# Patient Record
Sex: FEMALE | Race: WHITE | Employment: UNEMPLOYED | URBAN - METROPOLITAN AREA
[De-identification: names, ages, dates, MRNs, and addresses within clinical notes are randomized per-mention and may not be internally consistent; named-entity substitution may affect disease eponyms.]

---

## 2018-12-27 PROBLEM — M20.5X1 HALLUX LIMITUS OF RIGHT FOOT: Status: ACTIVE | Noted: 2018-12-27

## 2019-03-07 PROBLEM — M10.071 ACUTE IDIOPATHIC GOUT OF RIGHT FOOT: Status: ACTIVE | Noted: 2019-03-07

## 2021-10-14 PROBLEM — E11.9 DIABETES MELLITUS (HCC): Status: ACTIVE | Noted: 2021-10-14

## 2021-10-14 PROBLEM — M25.551 PAIN OF RIGHT HIP JOINT: Status: ACTIVE | Noted: 2021-10-14

## 2021-10-14 PROBLEM — R00.2 PALPITATIONS: Status: ACTIVE | Noted: 2021-10-14

## 2021-12-17 PROBLEM — H25.13 AGE-RELATED NUCLEAR CATARACT OF BOTH EYES: Status: ACTIVE | Noted: 2021-10-11

## 2023-03-31 ENCOUNTER — HOSPITAL ENCOUNTER (EMERGENCY)
Age: 68
Discharge: HOME | End: 2023-03-31
Attending: EMERGENCY MEDICINE
Payer: COMMERCIAL

## 2023-03-31 ENCOUNTER — APPOINTMENT (OUTPATIENT)
Dept: CT IMAGING | Age: 68
End: 2023-03-31
Attending: EMERGENCY MEDICINE
Payer: COMMERCIAL

## 2023-03-31 VITALS
HEART RATE: 80 BPM | OXYGEN SATURATION: 97 % | RESPIRATION RATE: 18 BRPM | HEIGHT: 62 IN | DIASTOLIC BLOOD PRESSURE: 83 MMHG | TEMPERATURE: 97 F | SYSTOLIC BLOOD PRESSURE: 132 MMHG | WEIGHT: 150 LBS | BODY MASS INDEX: 27.6 KG/M2

## 2023-03-31 DIAGNOSIS — R10.9 RIGHT FLANK PAIN: Primary | ICD-10-CM

## 2023-03-31 LAB
ALBUMIN SERPL-MCNC: 4 G/DL (ref 3.4–5)
ALBUMIN/GLOB SERPL: 1.1 {RATIO} (ref 1–2)
ALP LIVER SERPL-CCNC: 59 U/L
ALT SERPL-CCNC: 34 U/L
ANION GAP SERPL CALC-SCNC: 8 MMOL/L (ref 0–18)
AST SERPL-CCNC: 19 U/L (ref 15–37)
BASOPHILS # BLD AUTO: 0.04 X10(3) UL (ref 0–0.2)
BASOPHILS NFR BLD AUTO: 0.4 %
BILIRUB SERPL-MCNC: 0.2 MG/DL (ref 0.1–2)
BILIRUB UR QL STRIP.AUTO: NEGATIVE
BUN BLD-MCNC: 14 MG/DL (ref 7–18)
CALCIUM BLD-MCNC: 9.6 MG/DL (ref 8.5–10.1)
CHLORIDE SERPL-SCNC: 102 MMOL/L (ref 98–112)
CLARITY UR REFRACT.AUTO: CLEAR
CO2 SERPL-SCNC: 28 MMOL/L (ref 21–32)
COLOR UR AUTO: YELLOW
CREAT BLD-MCNC: 0.89 MG/DL
EOSINOPHIL # BLD AUTO: 0.1 X10(3) UL (ref 0–0.7)
EOSINOPHIL NFR BLD AUTO: 0.9 %
ERYTHROCYTE [DISTWIDTH] IN BLOOD BY AUTOMATED COUNT: 13.8 %
GFR SERPLBLD BASED ON 1.73 SQ M-ARVRAT: 72 ML/MIN/1.73M2 (ref 60–?)
GLOBULIN PLAS-MCNC: 3.8 G/DL (ref 2.8–4.4)
GLUCOSE BLD-MCNC: 113 MG/DL (ref 70–99)
GLUCOSE UR STRIP.AUTO-MCNC: NEGATIVE MG/DL
HCT VFR BLD AUTO: 40 %
HGB BLD-MCNC: 13.2 G/DL
IMM GRANULOCYTES # BLD AUTO: 0.03 X10(3) UL (ref 0–1)
IMM GRANULOCYTES NFR BLD: 0.3 %
KETONES UR STRIP.AUTO-MCNC: NEGATIVE MG/DL
LEUKOCYTE ESTERASE UR QL STRIP.AUTO: NEGATIVE
LYMPHOCYTES # BLD AUTO: 2.74 X10(3) UL (ref 1–4)
LYMPHOCYTES NFR BLD AUTO: 25.6 %
MCH RBC QN AUTO: 29.2 PG (ref 26–34)
MCHC RBC AUTO-ENTMCNC: 33 G/DL (ref 31–37)
MCV RBC AUTO: 88.5 FL
MONOCYTES # BLD AUTO: 1.01 X10(3) UL (ref 0.1–1)
MONOCYTES NFR BLD AUTO: 9.4 %
NEUTROPHILS # BLD AUTO: 6.78 X10 (3) UL (ref 1.5–7.7)
NEUTROPHILS # BLD AUTO: 6.78 X10(3) UL (ref 1.5–7.7)
NEUTROPHILS NFR BLD AUTO: 63.4 %
NITRITE UR QL STRIP.AUTO: NEGATIVE
OSMOLALITY SERPL CALC.SUM OF ELEC: 287 MOSM/KG (ref 275–295)
PH UR STRIP.AUTO: 5.5 [PH] (ref 5–8)
PLATELET # BLD AUTO: 317 10(3)UL (ref 150–450)
POTASSIUM SERPL-SCNC: 3.9 MMOL/L (ref 3.5–5.1)
PROT SERPL-MCNC: 7.8 G/DL (ref 6.4–8.2)
PROT UR STRIP.AUTO-MCNC: NEGATIVE MG/DL
RBC # BLD AUTO: 4.52 X10(6)UL
SARS-COV-2 RNA RESP QL NAA+PROBE: NOT DETECTED
SODIUM SERPL-SCNC: 138 MMOL/L (ref 136–145)
SP GR UR STRIP.AUTO: 1.01 (ref 1–1.03)
UROBILINOGEN UR STRIP.AUTO-MCNC: 0.2 MG/DL
WBC # BLD AUTO: 10.7 X10(3) UL (ref 4–11)

## 2023-03-31 PROCEDURE — 99284 EMERGENCY DEPT VISIT MOD MDM: CPT

## 2023-03-31 PROCEDURE — 80053 COMPREHEN METABOLIC PANEL: CPT | Performed by: EMERGENCY MEDICINE

## 2023-03-31 PROCEDURE — 74176 CT ABD & PELVIS W/O CONTRAST: CPT | Performed by: EMERGENCY MEDICINE

## 2023-03-31 PROCEDURE — 81001 URINALYSIS AUTO W/SCOPE: CPT | Performed by: EMERGENCY MEDICINE

## 2023-03-31 PROCEDURE — 85025 COMPLETE CBC W/AUTO DIFF WBC: CPT | Performed by: EMERGENCY MEDICINE

## 2023-03-31 PROCEDURE — 81015 MICROSCOPIC EXAM OF URINE: CPT | Performed by: EMERGENCY MEDICINE

## 2023-03-31 PROCEDURE — 36415 COLL VENOUS BLD VENIPUNCTURE: CPT

## 2023-03-31 RX ORDER — KETOROLAC TROMETHAMINE 15 MG/ML
15 INJECTION, SOLUTION INTRAMUSCULAR; INTRAVENOUS ONCE
Status: DISCONTINUED | OUTPATIENT
Start: 2023-03-31 | End: 2023-03-31

## 2023-03-31 RX ORDER — SODIUM CHLORIDE 9 MG/ML
125 INJECTION, SOLUTION INTRAVENOUS CONTINUOUS
Status: DISCONTINUED | OUTPATIENT
Start: 2023-03-31 | End: 2023-03-31

## 2023-04-01 NOTE — DISCHARGE INSTRUCTIONS
Ibuprofen for pain 400 mg 3 times a day with food for 3 days  Warm compresses topically to abdominal wall, I also try lidocaine patch.   Return if worse  Follow with your primary care physician on Monday

## 2024-05-15 ENCOUNTER — OFFICE VISIT (OUTPATIENT)
Dept: FAMILY MEDICINE CLINIC | Facility: CLINIC | Age: 69
End: 2024-05-15

## 2024-05-15 VITALS
WEIGHT: 146 LBS | HEIGHT: 62 IN | HEART RATE: 98 BPM | DIASTOLIC BLOOD PRESSURE: 64 MMHG | OXYGEN SATURATION: 100 % | BODY MASS INDEX: 26.87 KG/M2 | TEMPERATURE: 98 F | SYSTOLIC BLOOD PRESSURE: 120 MMHG

## 2024-05-15 DIAGNOSIS — U07.1 COVID-19: Primary | ICD-10-CM

## 2024-05-15 LAB
OPERATOR ID: ABNORMAL
POCT LOT NUMBER: ABNORMAL
RAPID SARS-COV-2 BY PCR: DETECTED

## 2024-05-15 PROCEDURE — 99213 OFFICE O/P EST LOW 20 MIN: CPT | Performed by: PHYSICIAN ASSISTANT

## 2024-05-15 PROCEDURE — U0002 COVID-19 LAB TEST NON-CDC: HCPCS | Performed by: PHYSICIAN ASSISTANT

## 2024-05-15 NOTE — PROGRESS NOTES
CHIEF COMPLAINT:     No chief complaint on file.      HPI:   Ese Welch is a 68 year old female who presents with complaints of feeling sick for  one day.  Symptoms include nasal congestion, nasal drainage, sore throat that resolved, subjective fever, chills, and ache.  The patient did not take her temperature with a thermometer.  She last had Tylenol 4.5 hours ago.  The patient denies ear pain, cough, CP, SOB, wheezing, abdominal pain, vomiting, diarrhea, and rash.  The patient is tolerating po.  The patient believes she has a sinus infection.     Current Outpatient Medications   Medication Sig Dispense Refill    nirmatrelvir-ritonavir 300-100 MG Oral Tablet Therapy Pack Take two nirmatrelvir tablets (300mg) with one ritonavir tablet (100mg) together twice daily for 5 days. 30 tablet 0    simvastatin 40 MG Oral Tab Take 1 tablet (40 mg total) by mouth daily. 30 tablet 3    Estradiol Acetate (FEMRING) 0.05 MG/24HR Vaginal Ring Place vaginally, remove and replace after 90 days 1 each 3    nystatin-triamcinolone 793379-6.1 UNIT/GM-% External Ointment Apply to affected area BID x 2 wks 30 g 0    Citalopram Hydrobromide 10 MG Oral Tab Take 1 tablet (10 mg total) by mouth daily.      Triamcinolone Acetonide 55 MCG/ACT Nasal Inhalant 2 sprays by Nasal route daily.      triamcinolone acetonide (KENALOG) 0.025 % Apply Externally Cream Apply  topically 2 (two) times daily.      MetFORMIN HCl 500 MG Oral Tab 1 TABLET TWICE DAILY WITH FOOD      MAXAIR AUTOHALER 200 MCG/INH IN AERB Inhale into the lungs.      ValACYclovir HCl (VALTREX) 500 MG Oral Tab         Past Medical History:    Acid reflux    inflammation    Age-related nuclear cataract    ANXIETY    DEPRESSION    HYPERLIPIDEMIA    MENOPAUSE    Sleep apnea    Stomach ulcer    Type II or unspecified type diabetes mellitus without mention of complication, not stated as uncontrolled      Social History:  Social History     Socioeconomic History    Marital status:       Spouse name: Artie    Number of children: 0    Years of education: 16   Occupational History    Occupation: retired      Comment: [employer]   Tobacco Use    Smoking status: Former     Current packs/day: 0.00     Average packs/day: 0.5 packs/day for 14.0 years (7.0 ttl pk-yrs)     Types: Cigarettes     Start date: 1983     Quit date: 1997     Years since quittin.4    Smokeless tobacco: Never    Tobacco comments:     quit 18 years ago    Vaping Use    Vaping status: Never Used   Substance and Sexual Activity    Alcohol use: No     Alcohol/week: 0.0 standard drinks of alcohol     Comment: Stopped 17 years ago-was drinking beer on two-three days a week.     Drug use: No    Sexual activity: Not Currently     Partners: Male   Other Topics Concern     Service No    Blood Transfusions No    Caffeine Concern Yes    Occupational Exposure No    Hobby Hazards No    Sleep Concern No    Stress Concern No    Weight Concern No    Special Diet No    Back Care Yes    Exercise Yes    Bike Helmet No    Seat Belt Yes    Self-Exams No   Social History Narrative    Lives with      Enjoys art, photography, biker, gardening        REVIEW OF SYSTEMS:   GENERAL: See HPI   SKIN: Denies rashes, skin wounds or ulcers.  EYES: Denies blurred vision or double vision  HENT: See HPI  CHEST: Denies chest pain, or palpitations  LUNGS: See HPI  GI: Denies abdominal pain, N/V/C/D.   MUSCULOSKELETAL: no arthralgia or swollen joints  LYMPH:  Denies lymphadenopathy  NEURO: Denies headaches or lightheadedness      EXAM:   /64   Pulse 98   Temp 98.1 °F (36.7 °C) (Oral)   Ht 5' 2\" (1.575 m)   Wt 146 lb (66.2 kg)   LMP 2009   SpO2 100%   BMI 26.70 kg/m²   GENERAL: well developed, well nourished,in no apparent distress, cooperative   SKIN: no rashes, nosuspicious lesions, no abnormal pigmentation  HEAD: atraumatic, normocephalic  EYES: EOM intact, PERRL.  Conjunctiva normal.  Cornea clear.  Lid  margins normal.  No active drainage.  EARS: Right TM normal, no bulging, no retraction, + fluid, bony landmarks normal.  Left TM normal, no bulging, no retraction, no fluid, bony landmarks normal.    NOSE: nostrils patent, no discharge, nasal mucosa pink and not inflamed.  No sinus tenderness.  THROAT: oral mucosa pink, moist and intact. No visible dental caries. Posterior pharynx without erythema or exudates.  NECK: supple, non-tender.  LUNGS: clear to auscultation bilaterally, no wheezes or rhonchi. Breathing is non labored.  CARDIO: RRR without murmur  GI: No visible scars, or masses. BS's present x4. No palpable masses or hepatosplenomegaly.  Non tender.  No guarding or rebound tenderness  EXTREMITIES: no cyanosis, clubbing or edema.  Homans NEG.  Dorsalis Pedis 2+.  LYMPH:  No lymphadenopathy.    NEURO: A&Ox3.  CN II-XII intact.  No focal deficits.  Coordination and Gait normal.  Kernig and Brudzinski's are negative.    Rapid COVID is POSITIVE    Paxlovid discussed at length.       ASSESSMENT AND PLAN:     ASSESSMENT:  Encounter Diagnosis   Name Primary?    COVID-19 Yes       PLAN:    Patient Instructions   Paxlovid- hold simvastatin  Flonase  Antihistamine  OTC supportive care and rest  Follow up with PCP   If emergent signs of symptoms to ER

## 2024-05-15 NOTE — PATIENT INSTRUCTIONS
Paxlovid- hold simvastatin  Flonase  Antihistamine  OTC supportive care and rest  Follow up with PCP   If emergent signs of symptoms to ER